# Patient Record
Sex: FEMALE | Race: BLACK OR AFRICAN AMERICAN | NOT HISPANIC OR LATINO | URBAN - METROPOLITAN AREA
[De-identification: names, ages, dates, MRNs, and addresses within clinical notes are randomized per-mention and may not be internally consistent; named-entity substitution may affect disease eponyms.]

---

## 2019-05-14 ENCOUNTER — EMERGENCY (EMERGENCY)
Facility: HOSPITAL | Age: 29
LOS: 1 days | Discharge: HOME | End: 2019-05-14
Attending: EMERGENCY MEDICINE | Admitting: EMERGENCY MEDICINE
Payer: COMMERCIAL

## 2019-05-14 VITALS
SYSTOLIC BLOOD PRESSURE: 120 MMHG | OXYGEN SATURATION: 100 % | DIASTOLIC BLOOD PRESSURE: 78 MMHG | HEART RATE: 110 BPM | RESPIRATION RATE: 16 BRPM | WEIGHT: 206.57 LBS | TEMPERATURE: 99 F

## 2019-05-14 VITALS
TEMPERATURE: 99 F | HEART RATE: 97 BPM | SYSTOLIC BLOOD PRESSURE: 108 MMHG | OXYGEN SATURATION: 99 % | DIASTOLIC BLOOD PRESSURE: 74 MMHG | RESPIRATION RATE: 16 BRPM

## 2019-05-14 PROCEDURE — 99283 EMERGENCY DEPT VISIT LOW MDM: CPT

## 2019-05-14 RX ORDER — DIPHENHYDRAMINE HCL 50 MG
25 CAPSULE ORAL ONCE
Refills: 0 | Status: COMPLETED | OUTPATIENT
Start: 2019-05-14 | End: 2019-05-14

## 2019-05-14 RX ORDER — FAMOTIDINE 10 MG/ML
40 INJECTION INTRAVENOUS ONCE
Refills: 0 | Status: COMPLETED | OUTPATIENT
Start: 2019-05-14 | End: 2019-05-14

## 2019-05-14 RX ADMIN — FAMOTIDINE 40 MILLIGRAM(S): 10 INJECTION INTRAVENOUS at 22:26

## 2019-05-14 RX ADMIN — Medication 25 MILLIGRAM(S): at 22:26

## 2019-05-14 RX ADMIN — Medication 25 MILLIGRAM(S): at 20:52

## 2019-05-14 NOTE — ED PROVIDER NOTE - CLINICAL SUMMARY MEDICAL DECISION MAKING FREE TEXT BOX
Pt w complaints of allergic reaction wo OP swelling, will treat with benadryl, pepcid, hold off on steroids as preg class C/D and no airway compromise.

## 2019-05-14 NOTE — ED PROVIDER NOTE - PROGRESS NOTE DETAILS
nad, lip swelling improved, no OP swelling. will dc with pepcid, prn benadryl, strict return prec given.

## 2019-05-14 NOTE — ED PROVIDER NOTE - NSFOLLOWUPINSTRUCTIONS_ED_ALL_ED_FT
Allergies, Adult  An allergy is when your body's defense system (immune system) overreacts to an otherwise harmless substance (allergen) that you breathe in or eat or something that touches your skin. When you come into contact with something that you are allergic to, your immune system produces certain proteins (antibodies). These proteins cause cells to release chemicals (histamines) that trigger the symptoms of an allergic reaction.    Allergies often affect the nasal passages (allergic rhinitis), eyes (allergic conjunctivitis), skin (atopic dermatitis), and stomach. Allergies can be mild or severe. Allergies cannot spread from person to person (are not contagious). They can develop at any age and may be outgrown.    What increases the risk?  You may be at greater risk of allergies if other people in your family have allergies.    What are the signs or symptoms?  Symptoms depend on what type of allergy you have. They may include:  Runny, stuffy nose.  Sneezing.  Itchy mouth, ears, or throat.  Postnasal drip.  Sore throat.  Itchy, red, watery, or puffy eyes.  Skin rash or hives.  Stomach pain.  Vomiting.  Diarrhea.  Bloating.  Wheezing or coughing.  People with a severe allergy to food, medicine, or an insect bite may have a life-threatening allergic reaction (anaphylaxis). Symptoms of anaphylaxis include:  Hives.  Itching.  Flushed face.  Swollen lips, tongue, or mouth.  Tight or swollen throat.  Chest pain or tightness in the chest.  Trouble breathing or shortness of breath.  Rapid heartbeat.  Dizziness or fainting.  Vomiting.  Diarrhea.  Pain in the abdomen.  How is this diagnosed?  This condition is diagnosed based on:  Your symptoms.  Your family and medical history.  A physical exam.  You may need to see a health care provider who specializes in treating allergies (allergist). You may also have tests, including:  Skin tests to see which allergens are causing your symptoms, such as:  Skin prick test. In this test, your skin is pricked with a tiny needle and exposed to small amounts of possible allergens to see if your skin reacts.  Intradermal skin test. In this test, a small amount of allergen is injected under your skin to see if your skin reacts.  Patch test. In this test, a small amount of allergen is placed on your skin and then your skin is covered with a bandage. Your health care provider will check your skin after a couple of days to see if a rash has developed.  Blood tests.  Challenges tests. In this test, you inhale a small amount of allergen by mouth to see if you have an allergic reaction.  You may also be asked to:  Keep a food diary. A food diary is a record of all the foods and drinks you have in a day and any symptoms you experience.  Practice an elimination diet. An elimination diet involves eliminating specific foods from your diet and then adding them back in one by one to find out if a certain food causes an allergic reaction.  How is this treated?  Treatment for allergies depends on your symptoms. Treatment may include:  Cold compresses to soothe itching and swelling.  Eye drops.  Nasal sprays.  Using a saline spray or container (neti pot) to flush out the nose (nasal irrigation). These methods can help clear away mucus and keep the nasal passages moist.  Using a humidifier.  Oral antihistamines or other medicines to block allergic reaction and inflammation.  Skin creams to treat rashes or itching.  Diet changes to eliminate food allergy triggers.  Repeated exposure to tiny amounts of allergens to build up a tolerance and prevent future allergic reactions (immunotherapy). These include:  Allergy shots.  Oral treatment. This involves taking small doses of an allergen under the tongue (sublingual immunotherapy).  Emergency epinephrine injection (auto-injector) in case of an allergic emergency. This is a self-injectable, pre-measured medicine that must be given within the first few minutes of a serious allergic reaction.  Follow these instructions at home:  Image Image Image   Avoid known allergens whenever possible.  If you suffer from airborne allergens, wash out your nose daily. You can do this with a saline spray or a neti pot to flush out your nose (nasal irrigation).  Take over-the-counter and prescription medicines only as told by your health care provider.  Keep all follow-up visits as told by your health care provider. This is important.  If you are at risk of a severe allergic reaction (anaphylaxis), keep your auto-injector with you at all times.  If you have ever had anaphylaxis, wear a medical alert bracelet or necklace that states you have a severe allergy.  Contact a health care provider if:  Your symptoms do not improve with treatment.  Get help right away if:  You have symptoms of anaphylaxis, such as:  Swollen mouth, tongue, or throat.  Pain or tightness in your chest.  Trouble breathing or shortness of breath.  Dizziness or fainting.  Severe abdominal pain, vomiting, or diarrhea.  This information is not intended to replace advice given to you by your health care provider. Make sure you discuss any questions you have with your health care provider.

## 2019-05-14 NOTE — ED ADULT NURSE NOTE - NSIMPLEMENTINTERV_GEN_ALL_ED
Implemented All Universal Safety Interventions:  [place] to call system. Call bell, personal items and telephone within reach. Instruct patient to call for assistance. Room bathroom lighting operational. Non-slip footwear when patient is off stretcher. Physically safe environment: no spills, clutter or unnecessary equipment. Stretcher in lowest position, wheels locked, appropriate side rails in place.

## 2019-05-14 NOTE — ED PROVIDER NOTE - NSFOLLOWUPCLINICS_GEN_ALL_ED_FT
[facility]  Family Medicine  [facility]. [address]  [place], [place] [facility]  Phone: [phone]  Fax:   Follow Up Time:

## 2019-05-14 NOTE — ED PROVIDER NOTE - OBJECTIVE STATEMENT
Pt previously healthy with complaints of lip swelling to lower lip, rash to left upper ext in antecubital fossa, no tongue swelling, hoarseness of voice, sob, abd pain, vaginal bleeding, currently seven weeks pregnant. symptoms started at 530pafter eating squash, known allergies to penicillin. Pt has not tried anything for symptoms, no other aggravating or relieving factors. Pt previously healthy with complaints of lip swelling to lower lip, rash to left upper ext in antecubital fossa, no tongue swelling, hoarseness of voice, sob, abd pain, vaginal bleeding, currently seven weeks pregnant. symptoms started at 530pafter eating squash, known allergies to penicillin. Pt has not tried anything for symptoms, no other aggravating or relieving factors. no bleeding, abdominal pain.

## 2019-05-14 NOTE — ED ADULT NURSE NOTE - OBJECTIVE STATEMENT
7 weeks pregnant LMP 3/23/19, complaining of lip, hand and feet swelling after eating dinner chicken, yellow rice and squash @ 6pm today, with slight throat discomfort, denies difficulty of breathing, as per pt early today she's been having gum pain, only meds prenatal medication and allergy to penicillin.

## 2019-05-14 NOTE — ED PROVIDER NOTE - PHYSICAL EXAMINATION
CONSTITUTIONAL: Well appearing, well nourished, awake, alert and in no apparent distress.  HEENT: Head is atraumatic. Eyes clear bilaterally, normal EOMI. Airway patent.  CARDIAC: Normal rate, regular rhythm.  Heart sounds S1, S2.   RESPIRATORY: Breath sounds clear and equal bilaterally. no tachypnea, respiratory distress.   GASTROINTESTINAL: Abdomen soft, non-tender, no guarding, distension.  MUSCULOSKELETAL: Spine appears normal, no midline spinal tenderness, range of motion is not limited, no muscle or joint tenderness. no bony tenderness. no JVD, peripheral edema.   NEUROLOGICAL: Alert and oriented, no focal deficits, no motor or sensory deficits.  SKIN: Skin normal color for race, warm, dry and intact. No evidence of rash.  PSYCHIATRIC: Alert and oriented to person, place, time/situation. normal mood and affect. no apparent risk to self or others. CONSTITUTIONAL: Well appearing, well nourished, awake, alert and in no apparent distress.  HEENT: Head is atraumatic. Eyes clear bilaterally, normal EOMI. Airway patent.  CARDIAC: Normal rate, regular rhythm.  Heart sounds S1, S2.   RESPIRATORY: Breath sounds clear and equal bilaterally. no tachypnea, respiratory distress.   GASTROINTESTINAL: Abdomen soft, non-tender, no guarding, distension.  MUSCULOSKELETAL: Spine appears normal, no midline spinal tenderness, range of motion is not limited, no muscle or joint tenderness. no bony tenderness. no JVD, peripheral edema.  mild erythema to anticubital fossa on L , no other rash  NEUROLOGICAL: Alert and oriented, no focal deficits, no motor or sensory deficits.  SKIN: Skin normal color for race, warm, dry and intact. No evidence of rash.  PSYCHIATRIC: Alert and oriented to person, place, time/situation. normal mood and affect. no apparent risk to self or others.

## 2019-05-15 RX ORDER — DIPHENHYDRAMINE HCL 50 MG
1 CAPSULE ORAL
Qty: 15 | Refills: 0
Start: 2019-05-15

## 2019-05-15 RX ORDER — FAMOTIDINE 10 MG/ML
2 INJECTION INTRAVENOUS
Qty: 6 | Refills: 0
Start: 2019-05-15 | End: 2019-05-17

## 2019-05-15 RX ORDER — EPINEPHRINE 0.3 MG/.3ML
0.3 INJECTION INTRAMUSCULAR; SUBCUTANEOUS
Qty: 2 | Refills: 0
Start: 2019-05-15

## 2019-05-18 DIAGNOSIS — Z88.0 ALLERGY STATUS TO PENICILLIN: ICD-10-CM

## 2019-05-18 DIAGNOSIS — T78.1XXA OTHER ADVERSE FOOD REACTIONS, NOT ELSEWHERE CLASSIFIED, INITIAL ENCOUNTER: ICD-10-CM
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
## 2019-05-18 DIAGNOSIS — R21 RASH AND OTHER NONSPECIFIC SKIN ERUPTION: ICD-10-CM
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
## 2019-05-18 DIAGNOSIS — K13.0 DISEASES OF LIPS: ICD-10-CM
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
## 2019-06-24 ENCOUNTER — EMERGENCY (EMERGENCY)
Facility: HOSPITAL | Age: 29
LOS: 1 days | Discharge: HOME | End: 2019-06-24
Attending: EMERGENCY MEDICINE | Admitting: EMERGENCY MEDICINE
Payer: MEDICAID

## 2019-06-24 VITALS
HEIGHT: 62 IN | OXYGEN SATURATION: 100 % | HEART RATE: 104 BPM | WEIGHT: 205.03 LBS | RESPIRATION RATE: 18 BRPM | SYSTOLIC BLOOD PRESSURE: 139 MMHG | DIASTOLIC BLOOD PRESSURE: 85 MMHG | TEMPERATURE: 99 F

## 2019-06-24 VITALS
RESPIRATION RATE: 18 BRPM | TEMPERATURE: 98 F | SYSTOLIC BLOOD PRESSURE: 110 MMHG | OXYGEN SATURATION: 100 % | DIASTOLIC BLOOD PRESSURE: 74 MMHG | HEART RATE: 90 BPM

## 2019-06-24 DIAGNOSIS — Z3A.13 13 WEEKS GESTATION OF PREGNANCY: ICD-10-CM

## 2019-06-24 DIAGNOSIS — O20.9 HEMORRHAGE IN EARLY PREGNANCY, UNSPECIFIED: ICD-10-CM

## 2019-06-24 DIAGNOSIS — O20.8 OTHER HEMORRHAGE IN EARLY PREGNANCY: ICD-10-CM

## 2019-06-24 DIAGNOSIS — Z88.0 ALLERGY STATUS TO PENICILLIN: ICD-10-CM

## 2019-06-24 PROBLEM — Z78.9 OTHER SPECIFIED HEALTH STATUS: Chronic | Status: ACTIVE | Noted: 2019-05-14

## 2019-06-24 LAB
ALBUMIN SERPL ELPH-MCNC: 4.1 G/DL — SIGNIFICANT CHANGE UP (ref 3.3–5)
ALP SERPL-CCNC: 50 U/L — SIGNIFICANT CHANGE UP (ref 40–120)
ALT FLD-CCNC: 42 U/L — SIGNIFICANT CHANGE UP (ref 10–45)
ANION GAP SERPL CALC-SCNC: 13 MMOL/L — SIGNIFICANT CHANGE UP (ref 5–17)
APTT BLD: 32.5 SEC — SIGNIFICANT CHANGE UP (ref 27.5–36.3)
AST SERPL-CCNC: 24 U/L — SIGNIFICANT CHANGE UP (ref 10–40)
BASOPHILS # BLD AUTO: 0.05 K/UL — SIGNIFICANT CHANGE UP (ref 0–0.2)
BASOPHILS NFR BLD AUTO: 0.4 % — SIGNIFICANT CHANGE UP (ref 0–2)
BILIRUB SERPL-MCNC: 0.2 MG/DL — SIGNIFICANT CHANGE UP (ref 0.2–1.2)
BLD GP AB SCN SERPL QL: NEGATIVE — SIGNIFICANT CHANGE UP
BUN SERPL-MCNC: 9 MG/DL — SIGNIFICANT CHANGE UP (ref 7–23)
CALCIUM SERPL-MCNC: 9.6 MG/DL — SIGNIFICANT CHANGE UP (ref 8.4–10.5)
CHLORIDE SERPL-SCNC: 102 MMOL/L — SIGNIFICANT CHANGE UP (ref 96–108)
CO2 SERPL-SCNC: 20 MMOL/L — LOW (ref 22–31)
CREAT SERPL-MCNC: 0.61 MG/DL — SIGNIFICANT CHANGE UP (ref 0.5–1.3)
EOSINOPHIL # BLD AUTO: 0.12 K/UL — SIGNIFICANT CHANGE UP (ref 0–0.5)
EOSINOPHIL NFR BLD AUTO: 0.8 % — SIGNIFICANT CHANGE UP (ref 0–6)
GLUCOSE SERPL-MCNC: 90 MG/DL — SIGNIFICANT CHANGE UP (ref 70–99)
HCG SERPL-ACNC: HIGH MIU/ML
HCT VFR BLD CALC: 35.9 % — SIGNIFICANT CHANGE UP (ref 34.5–45)
HGB BLD-MCNC: 11.9 G/DL — SIGNIFICANT CHANGE UP (ref 11.5–15.5)
IMM GRANULOCYTES NFR BLD AUTO: 0.8 % — SIGNIFICANT CHANGE UP (ref 0–1.5)
LYMPHOCYTES # BLD AUTO: 22 % — SIGNIFICANT CHANGE UP (ref 13–44)
LYMPHOCYTES # BLD AUTO: 3.12 K/UL — SIGNIFICANT CHANGE UP (ref 1–3.3)
MCHC RBC-ENTMCNC: 27.3 PG — SIGNIFICANT CHANGE UP (ref 27–34)
MCHC RBC-ENTMCNC: 33.1 GM/DL — SIGNIFICANT CHANGE UP (ref 32–36)
MCV RBC AUTO: 82.3 FL — SIGNIFICANT CHANGE UP (ref 80–100)
MONOCYTES # BLD AUTO: 1.78 K/UL — HIGH (ref 0–0.9)
MONOCYTES NFR BLD AUTO: 12.5 % — SIGNIFICANT CHANGE UP (ref 2–14)
NEUTROPHILS # BLD AUTO: 9.01 K/UL — HIGH (ref 1.8–7.4)
NEUTROPHILS NFR BLD AUTO: 63.5 % — SIGNIFICANT CHANGE UP (ref 43–77)
NRBC # BLD: 0 /100 WBCS — SIGNIFICANT CHANGE UP (ref 0–0)
PLATELET # BLD AUTO: 273 K/UL — SIGNIFICANT CHANGE UP (ref 150–400)
POTASSIUM SERPL-MCNC: 3.6 MMOL/L — SIGNIFICANT CHANGE UP (ref 3.5–5.3)
POTASSIUM SERPL-SCNC: 3.6 MMOL/L — SIGNIFICANT CHANGE UP (ref 3.5–5.3)
PROT SERPL-MCNC: 7.8 G/DL — SIGNIFICANT CHANGE UP (ref 6–8.3)
RBC # BLD: 4.36 M/UL — SIGNIFICANT CHANGE UP (ref 3.8–5.2)
RBC # FLD: 14.1 % — SIGNIFICANT CHANGE UP (ref 10.3–14.5)
RH IG SCN BLD-IMP: POSITIVE — SIGNIFICANT CHANGE UP
SODIUM SERPL-SCNC: 135 MMOL/L — SIGNIFICANT CHANGE UP (ref 135–145)
WBC # BLD: 14.2 K/UL — HIGH (ref 3.8–10.5)
WBC # FLD AUTO: 14.2 K/UL — HIGH (ref 3.8–10.5)

## 2019-06-24 PROCEDURE — 76802 OB US < 14 WKS ADDL FETUS: CPT | Mod: 26

## 2019-06-24 PROCEDURE — 76802 OB US < 14 WKS ADDL FETUS: CPT

## 2019-06-24 PROCEDURE — 86900 BLOOD TYPING SEROLOGIC ABO: CPT

## 2019-06-24 PROCEDURE — 85730 THROMBOPLASTIN TIME PARTIAL: CPT

## 2019-06-24 PROCEDURE — 86901 BLOOD TYPING SEROLOGIC RH(D): CPT

## 2019-06-24 PROCEDURE — 80053 COMPREHEN METABOLIC PANEL: CPT

## 2019-06-24 PROCEDURE — 99284 EMERGENCY DEPT VISIT MOD MDM: CPT

## 2019-06-24 PROCEDURE — 76801 OB US < 14 WKS SINGLE FETUS: CPT | Mod: 26

## 2019-06-24 PROCEDURE — 86850 RBC ANTIBODY SCREEN: CPT

## 2019-06-24 PROCEDURE — 85025 COMPLETE CBC W/AUTO DIFF WBC: CPT

## 2019-06-24 PROCEDURE — 84702 CHORIONIC GONADOTROPIN TEST: CPT

## 2019-06-24 PROCEDURE — 36415 COLL VENOUS BLD VENIPUNCTURE: CPT

## 2019-06-24 NOTE — ED PROVIDER NOTE - PHYSICAL EXAMINATION
GEN: Well appearing, well nourished, awake, alert, oriented to person, place, time/situation and in no apparent distress.  ENT: Airway patent, Nasal mucosa clear. Mouth with normal mucosa.  EYES: Clear bilaterally.  RESPIRATORY: Breathing comfortably with normal RR.  CARDIAC: Regular rate and rhythm  ABDOMEN: Soft, nontender, +bowel sounds, no rebound, rigidity, or guarding.  : deferred to gyn  MSK: Range of motion is not limited, no deformities noted.  NEURO: Alert and oriented, no focal deficits.  SKIN: Skin normal color for race, warm, dry and intact. No evidence of rash.  PSYCH: Alert and oriented to person, place, time/situation. normal mood and affect. no apparent risk to self or others.

## 2019-06-24 NOTE — ED ADULT NURSE NOTE - OBJECTIVE STATEMENT
pt is 13 weeks pregnant  with twins c/o vaginal bleeding for the last 30 mins. with abdominal cramps noted. pt denies fever ,  A0

## 2019-06-24 NOTE — CONSULT NOTE ADULT - ASSESSMENT
27yo  at 13w2d by LMP with twin pregnancy presenting with vaginal bleeding.  - evidence of subchorionic hemorrahge on ultrasound with normal FHx2 and normal amniotic fluid  - patient hemodynamically stable with no symptoms of anemia, normal hemoglobin  - elevated WBC with no urinary complaints or fevers; recommend sending UA to ensure absence of UTI  - counseled patient on pelvic rest for at least 2 weeks and to follow up with Dr. Gale in the office this week or to return if bleeding increases    D/W Dr. Allen

## 2019-06-24 NOTE — CONSULT NOTE ADULT - SUBJECTIVE AND OBJECTIVE BOX
27yo  at 13w2d by LMP 3/23 with twins presenting with vaginal bleeding.  She reports having spotting intermittently throughout her pregnancy, then today bled through a pad so came in for evaluation.  Denies any cramping, lightheadedness; otherwise feels well with occasional nausea.  Denies any dysuria, frequency or urgency, fevers.    OB/GYN Hx: follows with Dr. Gale ACP; G1 - current pregnancy  PMHx: HTN  SHx: none  Meds: PNV  Allergies: penicillin - hives    PHYSICAL EXAM:   Vital Signs Last 24 Hrs  T(C): 37.1 (2019 01:04), Max: 37.1 (2019 01:04)  T(F): 98.7 (:), Max: 98.7 (:)  HR: 104 (:) (104 - 104)  BP: 139/85 (:) (139/85 - 139/85)  BP(mean): --  RR: 18 (:) (18 - 18)  SpO2: 100% (:) (100% - 100%)    **************************  Constitutional: Alert & Oriented x3, No acute distress  Respiratory: regular work of breathing  Gastrointestinal: soft, non tender,no rebound or guarding   Pelvic exam: normal external genitalia, ~5 cc of blood in vaginal vault, cervix appears closed with no active bleeding or abnormal discharge  Extremities: no calf tenderness or swelling      LABS:                        11.9   14.20 )-----------( 273      ( :44 )             35.9     -    135  |  102  |  9   ----------------------------<  90  3.6   |  20<L>  |  0.61    Ca    9.6      :44    TPro  7.8  /  Alb  4.1  /  TBili  0.2  /  DBili  x   /  AST  24  /  ALT  42  /  AlkPhos  50  06-24    PTT - ( :44 )  PTT:32.5 sec    HCG Quantitative, Serum: 187574 mIU/mL ( @ 01:44)      RADIOLOGY & ADDITIONAL STUDIES:  < from: US Echo OB <=14 Weeks, Bacilio Gestation (19 @ 02:55) >    EXAM:  US OB LES THAN 14WK EA ADD GES                          PROCEDURE DATE:  2019          INTERPRETATION:  OBSTETRICAL ULTRASOUND - FIRST TRIMESTER dated 2019   2:55 AM    INDICATION: First trimester twin pregnancy with vaginal bleeding. LMP:   3/23/19.    TECHNIQUE: Transabdominal views of the pelvis were obtained followed by   transvaginal views for better visualization of the endometrial cavity.      PRIOR STUDIES: None    FINDINGS:   By dates, the estimated gestational age is13 weeks, 2 days.   A twin intrauterine gestation is visible. Twins are described below:    FETUS A:  Average ultrasound age of 13 weeks, 3 days.   Crown-rump length is 7.1 cm, corresponding to gestational age of 13   weeks, 3 days.    Abdominal circumference is 7.0 cm, corresponding to a gestational age of   13 weeks, 5 days.  Femur length is 1.1 cm, corresponding to a gestational age of 13 weeks, 1   day.  Fetal cardiac motion is visible with fetal heart rate of 160 beats per   minute.    FETUSB:  Average ultrasound age of 13 weeks, 1 day.   Crown-rump length is 6.7 cm, corresponding to gestational age of 13   weeks, 0 days.    Abdominal circumference is 6.2 cm, corresponding to a gestational age of   13 weeks, 0 days.  Femur length is 1.1cm, corresponding to a gestational age of 13 weeks, 2   days.  Fetal cardiac motion is visible with fetal heart rate of 161 beats per   minute.    A normal amount of amniotic fluid is evident. The placenta is located   towards the posterior fundus. No placenta previa is evident. There is a   questionable small to moderate-sized subchorionic hemorrhage in the lower   anterior uterus measuring 4.6 x 1.8 x 4.4 cm. The cervix is closed with a   length of 3 cm. There is however linear echogenic material in the cervix   suggestive of minimal hemorrhage.    The right ovary is upper normal size, measuring 4.8 x 2.2 x 3.5 cm with a   volume of 19 mL. The left ovary is upper normal size, measuring 3.4 x 4.3   x 2.8 cm with a volume of 20 mL. No ovarianmasses are seen. Doppler   evaluation demonstrates flow to both ovaries with no evidence of torsion.    No free pelvic fluid is identified.      IMPRESSION:   1.  Viable twin gestation with an average ultrasound age of 13 weeks, 2   days.   2.  Questionable small to moderate-sized subchorionic hemorrhage in the   anterior uterus.   3.  Closed cervix with suggestion of minimal hemorrhagic material within   the cervical canal.    ** Please note that this examination was performed SOLELY for the   evaluation of patient's emergent symptoms. A complete anatomic survey is   recommended for evaluation of fetal anatomy at 20 weeks. **            Thank you for the opportunity to participate in the care of this patient.    FELIX PRTAT M.D., RADIOLOGY RESIDENT  This document has been electronically signed.  JAREN CULLEN M.D., ATTENDING RADIOLOGIST  This document has been electronically signed. 2019  3:37AM                  < end of copied text >

## 2019-06-24 NOTE — ED PROVIDER NOTE - OBJECTIVE STATEMENT
29yo  at 13w2d by LMP 3/23 with twins presenting with vaginal bleeding.  She reports having spotting intermittently throughout her pregnancy, then today bled through a pad so came in for evaluation.  Denies any cramping, lightheadedness; otherwise feels well with occasional nausea.  Denies any dysuria, frequency or urgency, fevers.

## 2019-06-24 NOTE — ED PROVIDER NOTE - CLINICAL SUMMARY MEDICAL DECISION MAKING FREE TEXT BOX
per gyn, no vag bleeding on exam, OS closed, pt stable for DC with outpt follow up with Dr. Gale this week. The patient is stable for DC and is feeling much better.  Symptoms have improved and after discussion regarding discharge, patient feels comfortable going home.  Answers to all questions provided.  Patient feeling satisfactory with care and follow up plan discussed. They were advised to call their PMD for prompt outpatient follow up. Return precautions were discussed. The patient was advised to return to the ER for any concerning or worsening symptoms.

## 2019-06-24 NOTE — ED ADULT TRIAGE NOTE - ARRIVAL INFO ADDITIONAL COMMENTS
pt is 13 weeks pregnant who began vaginally bleeding 1 hour ago using 1 pad in last hour.  no clots.  pt is pregnant with twins.  pt has had spotting in the past.  mild cramping.  .

## 2019-06-24 NOTE — ED PROVIDER NOTE - CARE PROVIDER_API CALL
Araceli Gale)  Obstetrics and Gynecology  [address] [id number]  Phone: [phone]  Fax: [phone]  Follow Up Time:

## 2019-08-13 ENCOUNTER — INPATIENT (INPATIENT)
Facility: HOSPITAL | Age: 29
LOS: 2 days | Discharge: HOME | DRG: 832 | End: 2019-08-16
Attending: OBSTETRICS & GYNECOLOGY | Admitting: OBSTETRICS & GYNECOLOGY
Payer: MEDICAID

## 2019-08-13 VITALS — WEIGHT: 209.44 LBS | HEIGHT: 62 IN

## 2019-08-13 DIAGNOSIS — Z3A.00 WEEKS OF GESTATION OF PREGNANCY NOT SPECIFIED: ICD-10-CM

## 2019-08-13 DIAGNOSIS — O26.899 OTHER SPECIFIED PREGNANCY RELATED CONDITIONS, UNSPECIFIED TRIMESTER: ICD-10-CM

## 2019-08-13 LAB
ALBUMIN SERPL ELPH-MCNC: 3.7 G/DL — SIGNIFICANT CHANGE UP (ref 3.3–5)
ALP SERPL-CCNC: 61 U/L — SIGNIFICANT CHANGE UP (ref 40–120)
ALT FLD-CCNC: 42 U/L — SIGNIFICANT CHANGE UP (ref 10–45)
ANION GAP SERPL CALC-SCNC: 13 MMOL/L — SIGNIFICANT CHANGE UP (ref 5–17)
APPEARANCE UR: CLEAR — SIGNIFICANT CHANGE UP
AST SERPL-CCNC: 24 U/L — SIGNIFICANT CHANGE UP (ref 10–40)
BILIRUB SERPL-MCNC: 0.3 MG/DL — SIGNIFICANT CHANGE UP (ref 0.2–1.2)
BILIRUB UR-MCNC: NEGATIVE — SIGNIFICANT CHANGE UP
BLD GP AB SCN SERPL QL: NEGATIVE — SIGNIFICANT CHANGE UP
BUN SERPL-MCNC: 8 MG/DL — SIGNIFICANT CHANGE UP (ref 7–23)
CALCIUM SERPL-MCNC: 9.8 MG/DL — SIGNIFICANT CHANGE UP (ref 8.4–10.5)
CHLORIDE SERPL-SCNC: 104 MMOL/L — SIGNIFICANT CHANGE UP (ref 96–108)
CO2 SERPL-SCNC: 19 MMOL/L — LOW (ref 22–31)
COLOR SPEC: YELLOW — SIGNIFICANT CHANGE UP
CREAT SERPL-MCNC: 0.55 MG/DL — SIGNIFICANT CHANGE UP (ref 0.5–1.3)
DIFF PNL FLD: NEGATIVE — SIGNIFICANT CHANGE UP
GLUCOSE SERPL-MCNC: 130 MG/DL — HIGH (ref 70–99)
GLUCOSE UR QL: NEGATIVE — SIGNIFICANT CHANGE UP
HCT VFR BLD CALC: 31.7 % — LOW (ref 34.5–45)
HGB BLD-MCNC: 10.3 G/DL — LOW (ref 11.5–15.5)
KETONES UR-MCNC: 40 MG/DL
LEUKOCYTE ESTERASE UR-ACNC: NEGATIVE — SIGNIFICANT CHANGE UP
MCHC RBC-ENTMCNC: 26.9 PG — LOW (ref 27–34)
MCHC RBC-ENTMCNC: 32.5 GM/DL — SIGNIFICANT CHANGE UP (ref 32–36)
MCV RBC AUTO: 82.8 FL — SIGNIFICANT CHANGE UP (ref 80–100)
NITRITE UR-MCNC: NEGATIVE — SIGNIFICANT CHANGE UP
NRBC # BLD: 0 /100 WBCS — SIGNIFICANT CHANGE UP (ref 0–0)
PH UR: 6 — SIGNIFICANT CHANGE UP (ref 5–8)
PLATELET # BLD AUTO: 225 K/UL — SIGNIFICANT CHANGE UP (ref 150–400)
POTASSIUM SERPL-MCNC: 3.7 MMOL/L — SIGNIFICANT CHANGE UP (ref 3.5–5.3)
POTASSIUM SERPL-SCNC: 3.7 MMOL/L — SIGNIFICANT CHANGE UP (ref 3.5–5.3)
PROT SERPL-MCNC: 6.8 G/DL — SIGNIFICANT CHANGE UP (ref 6–8.3)
PROT UR-MCNC: NEGATIVE MG/DL — SIGNIFICANT CHANGE UP
RBC # BLD: 3.83 M/UL — SIGNIFICANT CHANGE UP (ref 3.8–5.2)
RBC # FLD: 14.6 % — HIGH (ref 10.3–14.5)
RH IG SCN BLD-IMP: POSITIVE — SIGNIFICANT CHANGE UP
RH IG SCN BLD-IMP: POSITIVE — SIGNIFICANT CHANGE UP
SODIUM SERPL-SCNC: 136 MMOL/L — SIGNIFICANT CHANGE UP (ref 135–145)
SP GR SPEC: 1.02 — SIGNIFICANT CHANGE UP (ref 1–1.03)
UROBILINOGEN FLD QL: 0.2 E.U./DL — SIGNIFICANT CHANGE UP
WBC # BLD: 14.54 K/UL — HIGH (ref 3.8–10.5)
WBC # FLD AUTO: 14.54 K/UL — HIGH (ref 3.8–10.5)

## 2019-08-14 ENCOUNTER — APPOINTMENT (OUTPATIENT)
Dept: ANTEPARTUM | Facility: CLINIC | Age: 29
End: 2019-08-14

## 2019-08-14 PROBLEM — Z34.90 ENCOUNTER FOR SUPERVISION OF NORMAL PREGNANCY, UNSPECIFIED, UNSPECIFIED TRIMESTER: Chronic | Status: ACTIVE | Noted: 2019-06-24

## 2019-08-14 PROBLEM — Z00.00 ENCOUNTER FOR PREVENTIVE HEALTH EXAMINATION: Status: ACTIVE | Noted: 2019-08-14

## 2019-08-14 LAB
CREAT ?TM UR-MCNC: 150 MG/DL — SIGNIFICANT CHANGE UP
PROT ?TM UR-MCNC: 12 MG/DL — SIGNIFICANT CHANGE UP (ref 0–12)
PROT/CREAT UR-RTO: 0.1 RATIO — SIGNIFICANT CHANGE UP (ref 0–0.2)

## 2019-08-14 PROCEDURE — 93010 ELECTROCARDIOGRAM REPORT: CPT

## 2019-08-14 RX ORDER — FERROUS SULFATE 325(65) MG
325 TABLET ORAL DAILY
Refills: 0 | Status: DISCONTINUED | OUTPATIENT
Start: 2019-08-14 | End: 2019-08-16

## 2019-08-14 RX ORDER — FOLIC ACID 0.8 MG
1 TABLET ORAL DAILY
Refills: 0 | Status: DISCONTINUED | OUTPATIENT
Start: 2019-08-14 | End: 2019-08-16

## 2019-08-14 RX ORDER — SODIUM CHLORIDE 9 MG/ML
3 INJECTION INTRAMUSCULAR; INTRAVENOUS; SUBCUTANEOUS EVERY 8 HOURS
Refills: 0 | Status: DISCONTINUED | OUTPATIENT
Start: 2019-08-14 | End: 2019-08-16

## 2019-08-14 RX ORDER — DOCUSATE SODIUM 100 MG
100 CAPSULE ORAL DAILY
Refills: 0 | Status: DISCONTINUED | OUTPATIENT
Start: 2019-08-14 | End: 2019-08-16

## 2019-08-14 RX ADMIN — Medication 325 MILLIGRAM(S): at 11:50

## 2019-08-14 RX ADMIN — Medication 1 MILLIGRAM(S): at 11:50

## 2019-08-14 RX ADMIN — SODIUM CHLORIDE 3 MILLILITER(S): 9 INJECTION INTRAMUSCULAR; INTRAVENOUS; SUBCUTANEOUS at 13:46

## 2019-08-14 RX ADMIN — Medication 1 TABLET(S): at 11:50

## 2019-08-14 RX ADMIN — SODIUM CHLORIDE 3 MILLILITER(S): 9 INJECTION INTRAMUSCULAR; INTRAVENOUS; SUBCUTANEOUS at 22:33

## 2019-08-14 RX ADMIN — Medication 100 MILLIGRAM(S): at 11:50

## 2019-08-15 ENCOUNTER — APPOINTMENT (OUTPATIENT)
Dept: ANTEPARTUM | Facility: CLINIC | Age: 29
End: 2019-08-15
Payer: MEDICAID

## 2019-08-15 LAB
CULTURE RESULTS: SIGNIFICANT CHANGE UP
SPECIMEN SOURCE: SIGNIFICANT CHANGE UP
T3 SERPL-MCNC: 177 NG/DL — SIGNIFICANT CHANGE UP (ref 80–200)
T4 AB SER-ACNC: 8.57 UG/DL — SIGNIFICANT CHANGE UP (ref 3.17–11.72)
TSH SERPL-MCNC: 0.99 UIU/ML — SIGNIFICANT CHANGE UP (ref 0.35–4.94)

## 2019-08-15 PROCEDURE — 76810 OB US >/= 14 WKS ADDL FETUS: CPT | Mod: 26,59

## 2019-08-15 PROCEDURE — 76805 OB US >/= 14 WKS SNGL FETUS: CPT | Mod: 26

## 2019-08-15 PROCEDURE — 76817 TRANSVAGINAL US OBSTETRIC: CPT | Mod: 26

## 2019-08-15 PROCEDURE — 93010 ELECTROCARDIOGRAM REPORT: CPT

## 2019-08-15 RX ORDER — PROGESTERONE 200 MG/1
200 CAPSULE, LIQUID FILLED ORAL AT BEDTIME
Refills: 0 | Status: DISCONTINUED | OUTPATIENT
Start: 2019-08-15 | End: 2019-08-16

## 2019-08-15 RX ORDER — PROGESTERONE 200 MG/1
200 CAPSULE, LIQUID FILLED ORAL AT BEDTIME
Refills: 0 | Status: DISCONTINUED | OUTPATIENT
Start: 2019-08-15 | End: 2019-08-15

## 2019-08-15 RX ADMIN — SODIUM CHLORIDE 3 MILLILITER(S): 9 INJECTION INTRAMUSCULAR; INTRAVENOUS; SUBCUTANEOUS at 07:11

## 2019-08-15 RX ADMIN — SODIUM CHLORIDE 3 MILLILITER(S): 9 INJECTION INTRAMUSCULAR; INTRAVENOUS; SUBCUTANEOUS at 22:11

## 2019-08-15 RX ADMIN — SODIUM CHLORIDE 3 MILLILITER(S): 9 INJECTION INTRAMUSCULAR; INTRAVENOUS; SUBCUTANEOUS at 16:42

## 2019-08-15 RX ADMIN — Medication 325 MILLIGRAM(S): at 16:42

## 2019-08-15 RX ADMIN — Medication 100 MILLIGRAM(S): at 16:42

## 2019-08-15 RX ADMIN — Medication 1 MILLIGRAM(S): at 16:42

## 2019-08-15 RX ADMIN — PROGESTERONE 200 MILLIGRAM(S): 200 CAPSULE, LIQUID FILLED ORAL at 22:11

## 2019-08-15 RX ADMIN — Medication 1 TABLET(S): at 16:42

## 2019-08-16 VITALS — WEIGHT: 210.98 LBS

## 2019-08-16 LAB
BLD GP AB SCN SERPL QL: NEGATIVE — SIGNIFICANT CHANGE UP
COLLECT DURATION TIME UR: 24 HR — SIGNIFICANT CHANGE UP
HCT VFR BLD CALC: 31.7 % — LOW (ref 34.5–45)
HGB BLD-MCNC: 10 G/DL — LOW (ref 11.5–15.5)
MCHC RBC-ENTMCNC: 26.9 PG — LOW (ref 27–34)
MCHC RBC-ENTMCNC: 31.5 GM/DL — LOW (ref 32–36)
MCV RBC AUTO: 85.2 FL — SIGNIFICANT CHANGE UP (ref 80–100)
NRBC # BLD: 0 /100 WBCS — SIGNIFICANT CHANGE UP (ref 0–0)
PLATELET # BLD AUTO: 211 K/UL — SIGNIFICANT CHANGE UP (ref 150–400)
PROT 24H UR-MRATE: 344 MG/24 H — HIGH (ref 50–100)
RBC # BLD: 3.72 M/UL — LOW (ref 3.8–5.2)
RBC # FLD: 14.6 % — HIGH (ref 10.3–14.5)
RH IG SCN BLD-IMP: POSITIVE — SIGNIFICANT CHANGE UP
TOTAL VOLUME - 24 HOUR: 1325 ML — SIGNIFICANT CHANGE UP
URINE CREATININE CALCULATION: 2.1 G/24 H — HIGH (ref 0.8–1.8)
WBC # BLD: 12.61 K/UL — HIGH (ref 3.8–10.5)
WBC # FLD AUTO: 12.61 K/UL — HIGH (ref 3.8–10.5)

## 2019-08-16 PROCEDURE — 86901 BLOOD TYPING SEROLOGIC RH(D): CPT

## 2019-08-16 PROCEDURE — 86850 RBC ANTIBODY SCREEN: CPT

## 2019-08-16 PROCEDURE — 36415 COLL VENOUS BLD VENIPUNCTURE: CPT

## 2019-08-16 PROCEDURE — 80053 COMPREHEN METABOLIC PANEL: CPT

## 2019-08-16 PROCEDURE — 99214 OFFICE O/P EST MOD 30 MIN: CPT

## 2019-08-16 PROCEDURE — 81003 URINALYSIS AUTO W/O SCOPE: CPT

## 2019-08-16 PROCEDURE — 84480 ASSAY TRIIODOTHYRONINE (T3): CPT

## 2019-08-16 PROCEDURE — 84436 ASSAY OF TOTAL THYROXINE: CPT

## 2019-08-16 PROCEDURE — 85027 COMPLETE CBC AUTOMATED: CPT

## 2019-08-16 PROCEDURE — 84443 ASSAY THYROID STIM HORMONE: CPT

## 2019-08-16 PROCEDURE — 86900 BLOOD TYPING SEROLOGIC ABO: CPT

## 2019-08-16 PROCEDURE — 93005 ELECTROCARDIOGRAM TRACING: CPT

## 2019-08-16 PROCEDURE — 87086 URINE CULTURE/COLONY COUNT: CPT

## 2019-08-16 PROCEDURE — 84156 ASSAY OF PROTEIN URINE: CPT

## 2019-08-16 PROCEDURE — 82570 ASSAY OF URINE CREATININE: CPT

## 2019-08-16 RX ORDER — PROGESTERONE 200 MG/1
1 CAPSULE, LIQUID FILLED ORAL
Qty: 30 | Refills: 0
Start: 2019-08-16 | End: 2019-09-14

## 2019-08-16 RX ADMIN — SODIUM CHLORIDE 3 MILLILITER(S): 9 INJECTION INTRAMUSCULAR; INTRAVENOUS; SUBCUTANEOUS at 06:55

## 2019-08-16 RX ADMIN — Medication 1 MILLIGRAM(S): at 10:43

## 2019-08-16 RX ADMIN — Medication 100 MILLIGRAM(S): at 10:43

## 2019-08-16 RX ADMIN — Medication 325 MILLIGRAM(S): at 10:43

## 2019-08-16 RX ADMIN — Medication 1 TABLET(S): at 10:43

## 2019-08-16 NOTE — DISCHARGE NOTE ANTEPARTUM - HOSPITAL COURSE
28yo  with mono-di twin gestation who presented on  at 20w3d for incidental finding of short cervix with no measurable cervical length on routine sonogram, admitted for further evaluation. She was found to be 1-2cm dilated on speculum exam though without contractions at the time so after consulting [facility], the plan was for repeat speculum exam, anatomy scan, and then amniocentesis and cerclage. However, on repeat speculum exam she was found to be 2-3cm dilated with bulging membranes and complained of some cramping. There also was a 18% growth discordance on anatomy scan. The decision was then made to continue with expectant management and vaginal progesterone 200mg qhs. During her antepartum course, patient was also found to have gHTN with several mild range blood pressures though preeclamptic labs were normal and patient denied toxic symptoms. 24 hour urine protein collection pending. Given patient was under a lot of stress during this stay, will reassess out-patient. Vitals are stable, patient clinically stable for discharge home.  labor precautions discussed. Patient should make a follow up out-patient ultrasound appointment next week. 30yo  with mono-di twin gestation who presented on  at 20w3d for incidental finding of short cervix with no measurable cervical length on routine sonogram, admitted for further evaluation. She was found to be 1-2cm dilated on speculum exam though without contractions at the time so after consulting [facility], the plan was for repeat speculum exam, anatomy scan, and then amniocentesis and cerclage. However, on repeat speculum exam she was found to be 2-3cm dilated with bulging membranes and complained of some cramping. There also was a 18% growth discordance on anatomy scan. The decision was then made to continue with expectant management and vaginal progesterone 200mg qhs. During her antepartum course, patient was also found to have preeclampsia without severe features with several mild range blood pressures and a 24 hr urine protein of 344mg, preeclamptic labs were normal and patient denies toxic symptoms. Given patient was under a lot of stress during this stay, will reassess out-patient. Vitals are stable, patient clinically stable for discharge home.  labor precautions discussed. Patient should make a follow up out-patient ultrasound appointment next week and has a fetal echo scheduled for next week.

## 2019-08-16 NOTE — DISCHARGE NOTE ANTEPARTUM - PATIENT PORTAL LINK FT
You can access the [facility] Patient Portal, offered by [facility], by registering with the following website: [email or web address]

## 2019-08-16 NOTE — DIETITIAN INITIAL EVALUATION ADULT. - OTHER INFO
28 y/o female at 20weeks gestation with mono-di-twins admitted with incompetent cervix/cervical insufficiency. Eating 80%.No N/V/D or pain reported.Noted by RN,to be upset yesterday regarding condition of one of the twins .As per patient, she was drinking Ensure at home and feels she needs it now. RD informed patient that she was eating adequate amounts and kcal could be derived from healthy foods.Claims she was eating a healthy diet PTA and was taking her MVI.

## 2019-08-16 NOTE — DIETITIAN INITIAL EVALUATION ADULT. - PHYSICAL APPEARANCE
Pre-pregnancy weight:192% of IBW.Appears to have lost 4 lbs.Question accuracy of weights./overweight

## 2019-08-16 NOTE — DISCHARGE NOTE ANTEPARTUM - PLAN OF CARE
Safe and healthy rest of pregnancy You were found to have a short cervix on ultrasound incidentally on 8/13 so were admitted for further evaluation. You continued to have cervical dilation from 1-2cm to 2-3cm so the decision was made to continue with expectant management and to begin vaginal progesterone 200mg at night. If you start having worsening cramping or contractions, vaginal bleeding, leakage of fluid, or decreased fetal movement, you should call your doctor and go to the hospital. You were found to have a short cervix on ultrasound incidentally on 8/13 so were admitted for further evaluation. You continued to have cervical dilation from 1-2cm to 2-3cm so the decision was made to continue with expectant management and to begin vaginal progesterone 200mg at night. If you start having worsening cramping or contractions, vaginal bleeding, leakage of fluid, or decreased fetal movement, you should call your doctor and go to the hospital. You were also found to have preeclampsia during this admission given your mild range blood pressures and protein in the urine. Monitor blood pressure 1-2 times a day. Document blood pressures to review with obstetrician at follow-up appointment. Return to hospital for systolic blood pressure (top number) equal to or greater than 160 AND/OR diastolic blood pressure (bottom number)equal to or greater than 110 OR any of the following symptoms: changes in vision, headache not relieved with Tylenol, severe abdominal pain, vomiting, increased vaginal bleeding, chest pain or shortness of breath. Call obstetrician for persistent systolic blood pressure (top number) equal to or greater than 150 AND/OR diastolic blood pressure (bottom number) equal to or greater than 100. You were found to have a short cervix on ultrasound incidentally on 8/13 so were admitted for further evaluation. You continued to have cervical dilation from 1-2cm to 2-3cm so the decision was made to continue with expectant management and to begin vaginal progesterone 200mg at night. If you start having worsening cramping or contractions, vaginal bleeding, leakage of fluid, or decreased fetal movement, you should call your doctor and go to the hospital. You were also found to have preeclampsia during this admission given your mild range blood pressures and protein in the urine. Monitor blood pressure 2 times a day. Document blood pressures to review with obstetrician at follow-up appointment. Return to hospital for systolic blood pressure (top number) equal to or greater than 160 AND/OR diastolic blood pressure (bottom number)equal to or greater than 110 OR any of the following symptoms: changes in vision, headache not relieved with Tylenol, severe abdominal pain, vomiting, increased vaginal bleeding, chest pain or shortness of breath. Call obstetrician for persistent systolic blood pressure (top number) equal to or greater than 150 AND/OR diastolic blood pressure (bottom number) equal to or greater than 100.

## 2019-08-16 NOTE — DIETITIAN INITIAL EVALUATION ADULT. - ENERGY NEEDS
Ht:5ft 2inches,IBW:110lbs 192% of IBW.BMI:38.  Adjusted for twin demands:[id number](plus 600kcal)1850-2100kcal and 71gmprotein and 35-40cc [id number] fluids

## 2019-08-16 NOTE — DISCHARGE NOTE ANTEPARTUM - CARE PLAN
Principal Discharge DX:	Short cervix during pregnancy in second trimester  Goal:	Safe and healthy rest of pregnancy  Assessment and plan of treatment:	You were found to have a short cervix on ultrasound incidentally on 8/13 so were admitted for further evaluation. You continued to have cervical dilation from 1-2cm to 2-3cm so the decision was made to continue with expectant management and to begin vaginal progesterone 200mg at night. If you start having worsening cramping or contractions, vaginal bleeding, leakage of fluid, or decreased fetal movement, you should call your doctor and go to the hospital. Principal Discharge DX:	Short cervix during pregnancy in second trimester  Goal:	Safe and healthy rest of pregnancy  Assessment and plan of treatment:	You were found to have a short cervix on ultrasound incidentally on 8/13 so were admitted for further evaluation. You continued to have cervical dilation from 1-2cm to 2-3cm so the decision was made to continue with expectant management and to begin vaginal progesterone 200mg at night. If you start having worsening cramping or contractions, vaginal bleeding, leakage of fluid, or decreased fetal movement, you should call your doctor and go to the hospital. You were also found to have preeclampsia during this admission given your mild range blood pressures and protein in the urine. Monitor blood pressure 1-2 times a day. Document blood pressures to review with obstetrician at follow-up appointment. Return to hospital for systolic blood pressure (top number) equal to or greater than 160 AND/OR diastolic blood pressure (bottom number)equal to or greater than 110 OR any of the following symptoms: changes in vision, headache not relieved with Tylenol, severe abdominal pain, vomiting, increased vaginal bleeding, chest pain or shortness of breath. Call obstetrician for persistent systolic blood pressure (top number) equal to or greater than 150 AND/OR diastolic blood pressure (bottom number) equal to or greater than 100. Principal Discharge DX:	Short cervix during pregnancy in second trimester  Goal:	Safe and healthy rest of pregnancy  Assessment and plan of treatment:	You were found to have a short cervix on ultrasound incidentally on 8/13 so were admitted for further evaluation. You continued to have cervical dilation from 1-2cm to 2-3cm so the decision was made to continue with expectant management and to begin vaginal progesterone 200mg at night. If you start having worsening cramping or contractions, vaginal bleeding, leakage of fluid, or decreased fetal movement, you should call your doctor and go to the hospital. You were also found to have preeclampsia during this admission given your mild range blood pressures and protein in the urine. Monitor blood pressure 2 times a day. Document blood pressures to review with obstetrician at follow-up appointment. Return to hospital for systolic blood pressure (top number) equal to or greater than 160 AND/OR diastolic blood pressure (bottom number)equal to or greater than 110 OR any of the following symptoms: changes in vision, headache not relieved with Tylenol, severe abdominal pain, vomiting, increased vaginal bleeding, chest pain or shortness of breath. Call obstetrician for persistent systolic blood pressure (top number) equal to or greater than 150 AND/OR diastolic blood pressure (bottom number) equal to or greater than 100.

## 2019-08-16 NOTE — DISCHARGE NOTE ANTEPARTUM - CARE PROVIDER_API CALL
Araceli Gale)  Obstetrics and Gynecology  [address] [id number]  Phone: [phone]  Fax: [phone]  Follow Up Time:

## 2019-08-20 DIAGNOSIS — R00.0 TACHYCARDIA, UNSPECIFIED: ICD-10-CM

## 2019-08-20 DIAGNOSIS — Z3A.20 20 WEEKS GESTATION OF PREGNANCY: ICD-10-CM

## 2019-08-20 DIAGNOSIS — O26.872 CERVICAL SHORTENING, SECOND TRIMESTER: ICD-10-CM

## 2019-08-20 DIAGNOSIS — O99.89 OTHER SPECIFIED DISEASES AND CONDITIONS COMPLICATING PREGNANCY, CHILDBIRTH AND THE PUERPERIUM: ICD-10-CM

## 2019-08-20 DIAGNOSIS — O99.282 ENDOCRINE, NUTRITIONAL AND METABOLIC DISEASES COMPLICATING PREGNANCY, SECOND TRIMESTER: ICD-10-CM

## 2019-08-20 DIAGNOSIS — Z34.02 ENCOUNTER FOR SUPERVISION OF NORMAL FIRST PREGNANCY, SECOND TRIMESTER: ICD-10-CM

## 2019-08-20 DIAGNOSIS — O14.02 MILD TO MODERATE PRE-ECLAMPSIA, SECOND TRIMESTER: ICD-10-CM

## 2019-08-20 DIAGNOSIS — O30.032 TWIN PREGNANCY, MONOCHORIONIC/DIAMNIOTIC, SECOND TRIMESTER: ICD-10-CM

## 2019-08-20 DIAGNOSIS — E28.2 POLYCYSTIC OVARIAN SYNDROME: ICD-10-CM

## 2023-05-26 NOTE — PATIENT PROFILE OB - FUNCTIONAL SCREEN CURRENT LEVEL: TRANSFERRING, MLM
Patient aware of appts requested by Krisitna. Rescheduled them to 7/5 per patient request   0 = independent
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
